# Patient Record
Sex: MALE | Race: AMERICAN INDIAN OR ALASKA NATIVE
[De-identification: names, ages, dates, MRNs, and addresses within clinical notes are randomized per-mention and may not be internally consistent; named-entity substitution may affect disease eponyms.]

---

## 2022-08-08 ENCOUNTER — HOSPITAL ENCOUNTER (EMERGENCY)
Dept: HOSPITAL 5 - ED | Age: 47
Discharge: HOME | End: 2022-08-08
Payer: COMMERCIAL

## 2022-08-08 VITALS — SYSTOLIC BLOOD PRESSURE: 179 MMHG | DIASTOLIC BLOOD PRESSURE: 96 MMHG

## 2022-08-08 DIAGNOSIS — K59.00: ICD-10-CM

## 2022-08-08 DIAGNOSIS — Z79.899: ICD-10-CM

## 2022-08-08 DIAGNOSIS — E11.9: ICD-10-CM

## 2022-08-08 DIAGNOSIS — A08.4: Primary | ICD-10-CM

## 2022-08-08 DIAGNOSIS — I10: ICD-10-CM

## 2022-08-08 DIAGNOSIS — F17.200: ICD-10-CM

## 2022-08-08 LAB
ALBUMIN SERPL-MCNC: 4.4 G/DL (ref 3.9–5)
ALT SERPL-CCNC: 12 UNITS/L (ref 7–56)
BASOPHILS # (AUTO): 0.1 K/MM3 (ref 0–0.1)
BASOPHILS NFR BLD AUTO: 1.8 % (ref 0–1.8)
BUN SERPL-MCNC: 48 MG/DL (ref 9–20)
BUN/CREAT SERPL: 4 %
CALCIUM SERPL-MCNC: 10 MG/DL (ref 8.4–10.2)
EOSINOPHIL # BLD AUTO: 0.6 K/MM3 (ref 0–0.4)
EOSINOPHIL NFR BLD AUTO: 9.2 % (ref 0–4.3)
HCT VFR BLD CALC: 40.7 % (ref 35.5–45.6)
HEMOLYSIS INDEX: 18
HGB BLD-MCNC: 13.1 GM/DL (ref 11.8–15.2)
LYMPHOCYTES # BLD AUTO: 0.8 K/MM3 (ref 1.2–5.4)
LYMPHOCYTES NFR BLD AUTO: 12.7 % (ref 13.4–35)
MCHC RBC AUTO-ENTMCNC: 32 % (ref 32–34)
MCV RBC AUTO: 80 FL (ref 84–94)
MONOCYTES # (AUTO): 0.9 K/MM3 (ref 0–0.8)
MONOCYTES % (AUTO): 15 % (ref 0–7.3)
PLATELET # BLD: 223 K/MM3 (ref 140–440)
RBC # BLD AUTO: 5.12 M/MM3 (ref 3.65–5.03)

## 2022-08-08 PROCEDURE — 83690 ASSAY OF LIPASE: CPT

## 2022-08-08 PROCEDURE — 80053 COMPREHEN METABOLIC PANEL: CPT

## 2022-08-08 PROCEDURE — 96375 TX/PRO/DX INJ NEW DRUG ADDON: CPT

## 2022-08-08 PROCEDURE — 74176 CT ABD & PELVIS W/O CONTRAST: CPT

## 2022-08-08 PROCEDURE — 96374 THER/PROPH/DIAG INJ IV PUSH: CPT

## 2022-08-08 PROCEDURE — 85025 COMPLETE CBC W/AUTO DIFF WBC: CPT

## 2022-08-08 PROCEDURE — 36415 COLL VENOUS BLD VENIPUNCTURE: CPT

## 2022-08-08 PROCEDURE — 99284 EMERGENCY DEPT VISIT MOD MDM: CPT

## 2022-08-08 NOTE — EMERGENCY DEPARTMENT REPORT
ED N/V/D HPI





- General


Chief complaint: Nausea/Vomiting/Diarrhea


Stated complaint: N/V


Source: EMS


Mode of arrival: Stretcher


Limitations: No Limitations





- History of Present Illness


Initial comments: 





Patient is a 46-year-old -American male with a history of hypertension a

nd ESRD on hemodialysis who presents to the ED with complaint of acute onset 

persistent diffuse abdominal pain with intractable nausea and vomiting and 

headache for the last 2 days.  Patient states that in the last 12 hours his 

symptoms are worsened such that he has had multiple vomiting episodes with 

worsening headache.  Patient denies dizziness, syncope, chest pain or shortness 

of breath, diarrhea, fever, chills, cough, sore throat, palpitations, change in 

vision, lightheadedness, hematemesis or hematochezia.


MD complaint: nausea, vomiting, abdominal pain (Diffuse)


-: days(s) (2)


Description of Vomiting: food contents, watery, bilious


Associated Abdominal Pain: Yes (Diffuse)


Location: diffuse


Radiation: none


Severity: severe


Pain Scale: 7


Quality: cramping, aching, sharp


Consistency: constant


Improves with: none


Worsens with: vomiting


Context: possible food poisoning


Associated Symptoms: denies other symptoms, headaches, loss of appetite, 

malaise, nausea/vomiting.  denies: myalgias, chest pain, cough, diaphoresis, 

fever/chills, rash, dysuria, shortness of breath, syncope, weakness





- Related Data


                                Home Medications











 Medication  Instructions  Recorded  Confirmed  Last Taken


 


Labetalol HCl [Labetalol 300mg TAB] 300 mg PO Q12H 22 

09:00








                                  Previous Rx's











 Medication  Instructions  Recorded  Last Taken  Type


 


NIFEdipine XL [Procardia Xl] 90 mg PO QDAY #60 tablet 22 09:00 Rx


 


Azithromycin [Zithromax TAB] 500 mg PO QDAY 3 Days #3 tablet 22 Unknown Rx


 


Doxazosin [Cardura] 4 mg PO BID 30 Days #30 tablet 22 Unknown Rx


 


ISOSORBIDE MONOnitrate [Imdur ER] 60 mg PO QDAY 30 Days #30 tablet 22 

Unknown Rx


 


NIFEdipine XL [Procardia Xl] 60 mg PO BID 30 Days #60 tablet 22 Unknown Rx


 


carvediloL [Coreg] 25 mg PO BID@0800,1700 30 Days #60 22 Unknown Rx





 tablet   


 


dexAMETHasone [Decadron] 6 mg PO Q24H 8 Days #8 tablet 22 Unknown Rx


 


hydrALAZINE [Apresoline TAB] 100 mg PO Q8HR 30 Days #90 tab 22 Unknown Rx


 


Dicyclomine [Bentyl] 20 mg PO Q6H PRN #30 tablet 22 Unknown Rx


 


Famotidine [Pepcid] 20 mg PO BID #60 tablet 22 Unknown Rx


 


Linaclotide [Linzess] 290 mcg PO DAILY #30 cap 22 Unknown Rx


 


Ondansetron [Zofran Odt] 4 mg PO Q8HR PRN #20 tab.rapdis 22 Unknown Rx











                                    Allergies











Allergy/AdvReac Type Severity Reaction Status Date / Time


 


No Known Allergies Allergy   Unverified 01/10/14 09:48














ED Review of Systems


ROS: 


Stated complaint: N/V


Other details as noted in HPI





Constitutional: denies: chills, fever


Eyes: denies: eye pain, eye discharge, vision change


ENT: denies: ear pain, throat pain


Respiratory: denies: cough, shortness of breath, wheezing


Cardiovascular: denies: chest pain, palpitations


Endocrine: no symptoms reported


Gastrointestinal: abdominal pain, nausea, vomiting.  denies: diarrhea


Genitourinary: denies: urgency, dysuria


Musculoskeletal: denies: back pain, joint swelling, arthralgia


Skin: denies: rash, lesions


Neurological: denies: headache, weakness, paresthesias


Psychiatric: denies: anxiety, depression


Hematological/Lymphatic: denies: easy bleeding, easy bruising





ED Past Medical Hx





- Past Medical History


Previous Medical History?: Yes


Hx Hypertension: Yes


Hx Congestive Heart Failure: No


Hx Diabetes: Yes


Hx Renal Disease: Yes


Hx Asthma: No


Hx COPD: No


Additional medical history: Brugada Syndrome, ventral abdominal hernia





- Surgical History


Past Surgical History?: Yes


Additional Surgical History: left AV Graft





- Social History


Smoking Status: Current Every Day Smoker


Substance Use Type: None





- Medications


Home Medications: 


                                Home Medications











 Medication  Instructions  Recorded  Confirmed  Last Taken  Type


 


NIFEdipine XL [Procardia Xl] 90 mg PO QDAY #60 tablet 22

09:00 Rx


 


Labetalol HCl [Labetalol 300mg TAB] 300 mg PO Q12H 22 

09:00 History


 


Azithromycin [Zithromax TAB] 500 mg PO QDAY 3 Days #3 tablet 22  Unknown 

Rx


 


Doxazosin [Cardura] 4 mg PO BID 30 Days #30 tablet 22  Unknown Rx


 


ISOSORBIDE MONOnitrate [Imdur ER] 60 mg PO QDAY 30 Days #30 tablet 22  

Unknown Rx


 


NIFEdipine XL [Procardia Xl] 60 mg PO BID 30 Days #60 tablet 22  Unknown 

Rx


 


carvediloL [Coreg] 25 mg PO BID@0800,1700 30 Days #60 22  Unknown Rx





 tablet    


 


dexAMETHasone [Decadron] 6 mg PO Q24H 8 Days #8 tablet 22  Unknown Rx


 


hydrALAZINE [Apresoline TAB] 100 mg PO Q8HR 30 Days #90 tab 22  Unknown Rx


 


Dicyclomine [Bentyl] 20 mg PO Q6H PRN #30 tablet 22  Unknown Rx


 


Famotidine [Pepcid] 20 mg PO BID #60 tablet 22  Unknown Rx


 


Linaclotide [Linzess] 290 mcg PO DAILY #30 cap 22  Unknown Rx


 


Ondansetron [Zofran Odt] 4 mg PO Q8HR PRN #20 tab.rapdis 22  Unknown Rx














ED Physical Exam





- General


Limitations: No Limitations


General appearance: alert, in no apparent distress





- Head


Head exam: Present: atraumatic, normocephalic, normal inspection





- Eye


Eye exam: Present: normal appearance, PERRL, EOMI





- ENT


ENT exam: Present: normal exam, normal orophraynx, mucous membranes moist, TM's 

normal bilaterally, normal external ear exam





- Neck


Neck exam: Present: normal inspection, full ROM.  Absent: tenderness





- Respiratory


Respiratory exam: Present: normal lung sounds bilaterally.  Absent: respiratory 

distress, wheezes, rales, rhonchi, stridor, chest wall tenderness, accessory 

muscle use, decreased breath sounds, prolonged expiratory





- Cardiovascular


Cardiovascular Exam: Present: regular rate, normal rhythm, normal heart sounds. 

Absent: systolic murmur, diastolic murmur, rubs, gallop





- GI/Abdominal


GI/Abdominal exam: Present: soft, tenderness (diffuse), normal bowel sounds.  

Absent: guarding, hyperactive bowel sounds, hypoactive bowel sounds, o

rganomegaly, mass





- Extremities Exam


Extremities exam: Present: normal inspection, full ROM, normal capillary refill.

 Absent: tenderness





- Back Exam


Back exam: Present: normal inspection, full ROM.  Absent: tenderness, CVA 

tenderness (R), CVA tenderness (L), muscle spasm, paraspinal tenderness, 

vertebral tenderness





- Neurological Exam


Neurological exam: Present: alert, oriented X3, CN II-XII intact, normal gait, 

reflexes normal





- Psychiatric


Psychiatric exam: Present: normal affect, normal mood





- Skin


Skin exam: Present: warm, dry, intact, normal color.  Absent: rash





ED Course





                                   Vital Signs











  22





  01:18


 


Temperature 98.2 F


 


Pulse Rate 90


 


Respiratory 18





Rate 


 


Blood Pressure 168/88


 


O2 Sat by Pulse 99





Oximetry 














ED Medical Decision Making





- Lab Data


Result diagrams: 


                                 22 02:35





                                 22 02:35





- Radiology Data


Radiology results: report reviewed, image reviewed





[place] [address]  


 


                                          Cat Scan Report   


                                               Signed  


 


Patient: KIRBY GAUTHIER                                                   

            MR#: [id number]  


[id number]          


: 1975                                                                

Acct:[id number]      


 


Age/Sex: 46 / M                                                                

ADM Date: 22     


 


Loc: ED       


Attending Dr:   


 


 


Ordering Physician: IVET SALEH  


Date of Service: 22  


Procedure(s): CT abdomen pelvis wo con  


Accession Number(s): [id number]  


 


cc: IVET SALEH   


 


 


CT ABDOMEN AND PELVIS WITHOUT CONTRAST  


 


 INDICATION / CLINICAL INFORMATION: ABDOMINAL PAIN, N/V, DISTENSION R/O SBO.  


 


 TECHNIQUE: Axial CT images were obtained through the abdomen and pelvis without

IV contrast.  All 


CT scans at this location are performed using CT dose reduction for ALARA by 

means of automated 


exposure control.   


 


 COMPARISON: Chest radiograph from 2022  


 


 FINDINGS:  


 


 LOWER CHEST: The heart is enlarged with mild bibasilar interstitial edema.  


 LIVER: No significant abnormality  


 GALLBLADDER/BILIARY TREE: No significant abnormality    


 PANCREAS: No significant abnormality  


 SPLEEN: No significant abnormality  


 ADRENALS: No significant abnormality  


 KIDNEYS/URETERS: Bilateral renal atrophy. No acute findings. No hydronephrosis.

 


 URINARY BLADDER: Bladder is partially decompressed, though grossly 

unremarkable.  


 REPRODUCTIVE ORGANS: No significant abnormality  


 STOMACH / BOWEL: Large colonic stool burden with small bowel feces sign. No 

evidence of localized 


bowel inflammation or obstruction. Normal appendix.  


 


 LYMPH NODES: No significant adenopathy.  


 VASCULATURE: Moderate atherosclerotic calcification without acute abnormality. 

 


 OTHER: Mild mesenteric edema. No free air, free fluid, or focal fluid 

collection is identified.  


 


 SKELETAL SYSTEM: No acute osseous findings.  


 


 IMPRESSION:  


 


 1. No acute abnormality of the abdomen or pelvis.  


 2. Large colonic stool burden, consistent with constipation. No evidence of 

localized bowel 


inflammation or obstruction.  


 3. Findings of volume overload/CHF with cardiac enlargement and mild bibasilar 

interstitial edema.  


 4. Other incidental findings as above.  


 


 


 Signer Name: Samantha Yeh MD   


 Signed: 2022 3:09 AM  


 Workstation Name: [id number]   


 


 


Transcribed By: MARIXA  


Dictated By: SAMANTHA YEH MD  


Electronically Authenticated By: SAMANTHA YEH MD    


Signed Date/Time: 22                                


 


 


 


DD/DT: 22                                                            

 


TD/TT:


Print





- Medical Decision Making





This is a 46-year-old -American male with a history of hypertension and 

ESRD on hemodialysis who presents to the ED with complaint of acute onset 

persistent diffuse abdominal pain with intractable nausea and vomiting and 

headache for the last 2 days.  Patient states that in the last 12 hours his 

symptoms are worsened such that he has had multiple vomiting episodes with 

worsening headache.  In the ED, patient is alert and oriented x3 and is not in 

any distress.  Patient was treated in the ED for nausea and vomiting, also given

pain medications.  Lab test results were reviewed and are all nonactionable.  

Abdomen pelvis CT scan without contrast showed no acute abnormalities but inc

idental finding of large colonic stool burden, consistent with constipation. No 

evidence of localized bowel inflammation or obstruction.  Also showed findings 

of volume overload/CHF with cardiac enlargement and mild bibasilar interstitial 

edema which are consistent with the patient's ESRD condition.  On reevaluation, 

patient's pain is well controlled medication.  Patient nausea and vomiting also 

well controlled.  Patient passed oral fluid challenge in the ED.  Patient was 

also advised to maintain a clear liquid diet for 12 to 24 hours while taking 

medications.  Patient was discharged home on medications and advised to follow-

up with his primary care physician in 5 to 7 days for reevaluation or return to 

the ED immediately if symptoms get worse.





- Differential Diagnosis


Gastroenteritis; GERD; SBO; constipation; appendicitis; colitis;


Critical care attestation.: 


If time is entered above; I have spent that time in minutes in the direct care 

of this critically ill patient, excluding procedure time.








ED Disposition


Clinical Impression: 


 Nausea and vomiting in adult patient, Abdominal pain, generalized, Viral 

gastroenteritis





Constipation


Qualifiers:


 Constipation type: other constipation type Qualified Code(s): K59.09 - Other 

constipation





Disposition: 01 HOME / SELF CARE / HOMELESS


Is pt being admited?: No


Does the pt Need Aspirin: No


Condition: Stable


Instructions:  Viral Gastroenteritis, Adult, Easy-to-Read, Nausea and Vomiting, 

Adult, Easy-to-Read, Abdominal Pain, Adult, Easy-to-Read, Constipation, Adult, 

Easy-to-Read


Additional Instructions: 


All lab test results were reviewed and are all nonactionable.  Abdomen pelvis CT

scan without contrast showed large colonic stool burden, consistent with 

constipation. No evidence of localized bowel inflammation or obstruction.  It 

also showed findings of volume overload/CHF with cardiac enlargement and mild 

bibasilar interstitial edema consistent with your chronic kidney disease.  

Therefore maintain a clear liquid diet for 12 to 24 hours, and plenty of fluids,

take medication as needed for pain and nausea and vomiting as well as for 

constipation.  Follow-up with your primary care physician in 7 to 10 days for 

reevaluation or return to the ED immediately if symptoms get worse.


Prescriptions: 


Dicyclomine [Bentyl] 20 mg PO Q6H PRN #30 tablet


 PRN Reason: abdominal pain


Linaclotide [Linzess] 290 mcg PO DAILY #30 cap


Famotidine [Pepcid] 20 mg PO BID #60 tablet


Ondansetron [Zofran Odt] 4 mg PO Q8HR PRN #20 tab.rapdis


 PRN Reason: Nausea


Referrals: 


LIBBY BREWSTER MD [Primary Care Provider] - 3-5 Days


Time of Disposition: 04:48


Print Language: ENGLISH

## 2022-08-08 NOTE — CAT SCAN REPORT
CT ABDOMEN AND PELVIS WITHOUT CONTRAST



INDICATION / CLINICAL INFORMATION: ABDOMINAL PAIN, N/V, DISTENSION R/O SBO.



TECHNIQUE: Axial CT images were obtained through the abdomen and pelvis without IV contrast.  All CT 
scans at this location are performed using CT dose reduction for ALARA by means of automated exposure
 control. 



COMPARISON: Chest radiograph from 5/31/2022



FINDINGS:



LOWER CHEST: The heart is enlarged with mild bibasilar interstitial edema.

LIVER: No significant abnormality

GALLBLADDER/BILIARY TREE: No significant abnormality  

PANCREAS: No significant abnormality

SPLEEN: No significant abnormality

ADRENALS: No significant abnormality

KIDNEYS/URETERS: Bilateral renal atrophy. No acute findings. No hydronephrosis.

URINARY BLADDER: Bladder is partially decompressed, though grossly unremarkable.

REPRODUCTIVE ORGANS: No significant abnormality

STOMACH / BOWEL: Large colonic stool burden with small bowel feces sign. No evidence of localized bow
el inflammation or obstruction. Normal appendix.



LYMPH NODES: No significant adenopathy.

VASCULATURE: Moderate atherosclerotic calcification without acute abnormality. 

OTHER: Mild mesenteric edema. No free air, free fluid, or focal fluid collection is identified.



SKELETAL SYSTEM: No acute osseous findings.



IMPRESSION:



1. No acute abnormality of the abdomen or pelvis.

2. Large colonic stool burden, consistent with constipation. No evidence of localized bowel inflammat
ion or obstruction.

3. Findings of volume overload/CHF with cardiac enlargement and mild bibasilar interstitial edema.

4. Other incidental findings as above.





Signer Name: Keyshawn Yeh MD 

Signed: 8/8/2022 3:09 AM

Workstation Name: [email or web address]

## 2022-08-19 ENCOUNTER — HOSPITAL ENCOUNTER (INPATIENT)
Dept: HOSPITAL 5 - ED | Age: 47
LOS: 1 days | Discharge: HOME | DRG: 189 | End: 2022-08-20
Attending: INTERNAL MEDICINE | Admitting: INTERNAL MEDICINE
Payer: SELF-PAY

## 2022-08-19 DIAGNOSIS — Z87.891: ICD-10-CM

## 2022-08-19 DIAGNOSIS — I49.8: ICD-10-CM

## 2022-08-19 DIAGNOSIS — E78.5: ICD-10-CM

## 2022-08-19 DIAGNOSIS — J96.01: Primary | ICD-10-CM

## 2022-08-19 DIAGNOSIS — J81.0: ICD-10-CM

## 2022-08-19 DIAGNOSIS — N18.6: ICD-10-CM

## 2022-08-19 DIAGNOSIS — E11.22: ICD-10-CM

## 2022-08-19 DIAGNOSIS — I12.0: ICD-10-CM

## 2022-08-19 DIAGNOSIS — D63.1: ICD-10-CM

## 2022-08-19 DIAGNOSIS — J44.9: ICD-10-CM

## 2022-08-19 DIAGNOSIS — Z99.2: ICD-10-CM

## 2022-08-19 DIAGNOSIS — I16.1: ICD-10-CM

## 2022-08-19 LAB
BASOPHILS # (AUTO): 0.1 K/MM3 (ref 0–0.1)
BASOPHILS NFR BLD AUTO: 1.6 % (ref 0–1.8)
BUN SERPL-MCNC: 73 MG/DL (ref 9–20)
BUN/CREAT SERPL: 7 %
CALCIUM SERPL-MCNC: 9.8 MG/DL (ref 8.4–10.2)
EOSINOPHIL # BLD AUTO: 0.3 K/MM3 (ref 0–0.4)
EOSINOPHIL NFR BLD AUTO: 6.7 % (ref 0–4.3)
HCT VFR BLD CALC: 37.8 % (ref 35.5–45.6)
HEMOLYSIS INDEX: 2
HGB BLD-MCNC: 12.1 GM/DL (ref 11.8–15.2)
INR PPP: 0.94 (ref 0.87–1.13)
LYMPHOCYTES # BLD AUTO: 0.6 K/MM3 (ref 1.2–5.4)
LYMPHOCYTES NFR BLD AUTO: 12.2 % (ref 13.4–35)
MCHC RBC AUTO-ENTMCNC: 32 % (ref 32–34)
MCV RBC AUTO: 80 FL (ref 84–94)
MONOCYTES # (AUTO): 0.8 K/MM3 (ref 0–0.8)
MONOCYTES % (AUTO): 15.6 % (ref 0–7.3)
PLATELET # BLD: 203 K/MM3 (ref 140–440)
RBC # BLD AUTO: 4.71 M/MM3 (ref 3.65–5.03)

## 2022-08-19 PROCEDURE — 5A1D70Z PERFORMANCE OF URINARY FILTRATION, INTERMITTENT, LESS THAN 6 HOURS PER DAY: ICD-10-PCS | Performed by: INTERNAL MEDICINE

## 2022-08-19 PROCEDURE — 85025 COMPLETE CBC W/AUTO DIFF WBC: CPT

## 2022-08-19 PROCEDURE — 84484 ASSAY OF TROPONIN QUANT: CPT

## 2022-08-19 PROCEDURE — 94760 N-INVAS EAR/PLS OXIMETRY 1: CPT

## 2022-08-19 PROCEDURE — 94644 CONT INHLJ TX 1ST HOUR: CPT

## 2022-08-19 PROCEDURE — 71045 X-RAY EXAM CHEST 1 VIEW: CPT

## 2022-08-19 PROCEDURE — 94640 AIRWAY INHALATION TREATMENT: CPT

## 2022-08-19 PROCEDURE — 99285 EMERGENCY DEPT VISIT HI MDM: CPT

## 2022-08-19 PROCEDURE — 80053 COMPREHEN METABOLIC PANEL: CPT

## 2022-08-19 PROCEDURE — 36415 COLL VENOUS BLD VENIPUNCTURE: CPT

## 2022-08-19 PROCEDURE — 80048 BASIC METABOLIC PNL TOTAL CA: CPT

## 2022-08-19 PROCEDURE — 82550 ASSAY OF CK (CPK): CPT

## 2022-08-19 PROCEDURE — 93005 ELECTROCARDIOGRAM TRACING: CPT

## 2022-08-19 PROCEDURE — 82553 CREATINE MB FRACTION: CPT

## 2022-08-19 PROCEDURE — 80074 ACUTE HEPATITIS PANEL: CPT

## 2022-08-19 PROCEDURE — 85610 PROTHROMBIN TIME: CPT

## 2022-08-19 RX ADMIN — SENNOSIDES SCH MG: 8.6 TABLET, FILM COATED ORAL at 10:52

## 2022-08-19 RX ADMIN — IPRATROPIUM BROMIDE AND ALBUTEROL SULFATE SCH: .5; 3 SOLUTION RESPIRATORY (INHALATION) at 20:50

## 2022-08-19 RX ADMIN — Medication SCH ML: at 10:52

## 2022-08-19 RX ADMIN — HYDRALAZINE HYDROCHLORIDE SCH MG: 100 TABLET, FILM COATED ORAL at 19:58

## 2022-08-19 RX ADMIN — Medication SCH ML: at 22:00

## 2022-08-19 RX ADMIN — FAMOTIDINE SCH MG: 10 INJECTION, SOLUTION INTRAVENOUS at 22:00

## 2022-08-19 RX ADMIN — NIFEDIPINE SCH MG: 60 TABLET, EXTENDED RELEASE ORAL at 22:00

## 2022-08-19 RX ADMIN — HYDRALAZINE HYDROCHLORIDE PRN MG: 20 INJECTION INTRAMUSCULAR; INTRAVENOUS at 09:29

## 2022-08-19 RX ADMIN — HYDRALAZINE HYDROCHLORIDE SCH MG: 100 TABLET, FILM COATED ORAL at 22:00

## 2022-08-19 RX ADMIN — ARFORMOTEROL TARTRATE SCH MCG: 15 SOLUTION RESPIRATORY (INHALATION) at 20:50

## 2022-08-19 RX ADMIN — BUDESONIDE SCH MG: 0.5 INHALANT RESPIRATORY (INHALATION) at 20:50

## 2022-08-19 RX ADMIN — FAMOTIDINE SCH MG: 10 INJECTION, SOLUTION INTRAVENOUS at 09:29

## 2022-08-19 RX ADMIN — SENNOSIDES SCH MG: 8.6 TABLET, FILM COATED ORAL at 22:00

## 2022-08-19 NOTE — ELECTROCARDIOGRAPH REPORT
[facility]

                                       

Test Date:    2022               Test Time:    06:12:29

Pat Name:     KIRBY GAUTHIER            Department:   

Patient ID:   [id number]          Room:         A365

Gender:       M                        Technician:   MARIXA

:          1975               Requested By: PREETI GAMEZ

Order Number: [id number]             Reading MD:   Sarahi Kemp

                                 Measurements

Intervals                              Axis          

Rate:         84                       P:            62

WI:           196                      QRS:          52

QRSD:         83                       T:            35

QT:           365                                    

QTc:          431                                    

                           Interpretive Statements

Sinus rhythm

Biatrial enlargement

Probable left ventricular hypertrophy

Compared to ECG 2022 20:30:03

non st & T no longer present

Electronically Signed On 2022 22:46:30 EDT by Sarahi Kemp

## 2022-08-19 NOTE — CONSULTATION
History of Present Illness





- Reason for Consult


Consult date: 08/19/22


end stage renal disease





- History of Present Illness


The patient is a 45 YO male with history significant for Hypertension, DVT, 

Anemia, history of Brugada syndrome, ESRD on hemodialysis(MWF) and Medical non-

compliance who presented to [facility] ED 8/81922 with c/o sob for 2 days.  Associated

symptoms include cough, orthopnea and wheezing.  Admits to drinking excessive 

fluids.  Patient denies any N, V, abd pain, fever, chills, rash, leg swelling, 

cp, dizziness, hemoptysis or weakness.  He was last dialyzed 2 days ago.  [facility] hospital admissions with similar presentation.  All lab and imaging studies

reviewed.  Patient is currently on NC O2.  Nephrology consulted for treatment of

ESRD and volume overload.








Past History


Past Medical History: other (See HPI.)





Medications and Allergies


                                    Allergies











Allergy/AdvReac Type Severity Reaction Status Date / Time


 


No Known Allergies Allergy   Unverified 01/10/14 09:48











                                Home Medications











 Medication  Instructions  Recorded  Confirmed  Last Taken  Type


 


NIFEdipine XL [Procardia Xl] 90 mg PO QDAY #60 tablet 05/12/22 06/01/22 05/31/22

09:00 Rx


 


Labetalol HCl [Labetalol 300mg TAB] 300 mg PO Q12H 06/01/22 06/01/22 05/31/22 

09:00 History


 


Azithromycin [Zithromax TAB] 500 mg PO QDAY 3 Days #3 tablet 06/02/22  Unknown 

Rx


 


Doxazosin [Cardura] 4 mg PO BID 30 Days #30 tablet 06/02/22  Unknown Rx


 


ISOSORBIDE MONOnitrate [Imdur ER] 60 mg PO QDAY 30 Days #30 tablet 06/02/22  

Unknown Rx


 


NIFEdipine XL [Procardia Xl] 60 mg PO BID 30 Days #60 tablet 06/02/22  Unknown 

Rx


 


carvediloL [Coreg] 25 mg PO BID@0800,1700 30 Days #60 06/02/22  Unknown Rx





 tablet    


 


dexAMETHasone [Decadron] 6 mg PO Q24H 8 Days #8 tablet 06/02/22  Unknown Rx


 


hydrALAZINE [Apresoline TAB] 100 mg PO Q8HR 30 Days #90 tab 06/02/22  Unknown Rx


 


Dicyclomine [Bentyl] 20 mg PO Q6H PRN #30 tablet 08/08/22  Unknown Rx


 


Famotidine [Pepcid] 20 mg PO BID #60 tablet 08/08/22  Unknown Rx


 


Linaclotide [Linzess] 290 mcg PO DAILY #30 cap 08/08/22  Unknown Rx


 


Ondansetron [Zofran Odt] 4 mg PO Q8HR PRN #20 tab.rapdis 08/08/22  Unknown Rx











Active Meds: 


Active Medications





Acetaminophen (Acetaminophen 325 Mg Tab)  650 mg PO Q4H PRN


   PRN Reason: Pain MILD(1-3)/Fever >100.5/HA


Albuterol (Albuterol 2.5 Mg/3 Ml Nebu)  2.5 mg IH Q3HRT PRN


   PRN Reason: Shortness Of Breath


Albuterol/Ipratropium (Ipratropium/Albuterol Sulfate 3 Ml Ampul.Neb)  1 ampul IH

Q6HRT NANCY


Arformoterol Tartrate (Arformoterol 15 Mcg/2 Ml Nebu)  15 mcg IH Q12HRT NANCY


Budesonide (Budesonide 0.5 Mg/2 Ml Nebu)  0.5 mg IH Q12HRT NANCY


Dicyclomine HCl (Dicyclomine 20 Mg Tab)  20 mg PO Q6H PRN


   PRN Reason: abdominal pain


Famotidine (Famotidine 20 Mg/2 Ml Inj)  20 mg IV BID NANCY


Hydralazine HCl (Hydralazine 20 Mg/1 Ml Inj)  10 mg IV Q4HR PRN


   PRN Reason: Hypertension


Hydralazine HCl (Hydralazine 100 Mg Tab)  100 mg PO Q8HR [facility]


Isosorbide Mononitrate (Isosorbide Mononitrate Er 60 Mg Tab)  60 mg PO QDAY NANCY


Miscellaneous Medication (Labetalol Hcl [Labetalol 300mg Tab])  300 mg PO Q12H 

NANCY


Naloxone HCl (Naloxone 0.4 Mg/1 Ml Inj)  0.1 mg IV Q2MIN PRN


   PRN Reason: Res Rate </= 8 or 02 SAT < 92%


Ondansetron HCl (Ondansetron 4 Mg/2 Ml Inj)  4 mg IV Q8H PRN


   PRN Reason: Nausea And Vomiting


Ondansetron HCl (Ondansetron 4 Mg Odt Tab)  4 mg PO Q8HR PRN


   PRN Reason: Nausea


Oxycodone/Acetaminophen (Oxycodone /Acetaminophen 5-325mg Tab)  1 tab PO Q6H PRN


   PRN Reason: Pain, Moderate (4-6)


Senna (Sennosides 8.6 Mg Tab)  8.6 mg PO Q12HR NANCY


Sodium Chloride (Sodium Chloride 0.9% 10 Ml Flush Syringe)  10 ml IV BID NANCY


Sodium Chloride (Sodium Chloride 0.9% 10 Ml Flush Syringe)  10 ml IV PRN PRN


   PRN Reason: LINE FLUSH











Exam





- Vital Signs


Vital signs: 


                                   Vital Signs











Temp Pulse Resp BP Pulse Ox


 


 98.6 F   90   18   202/112   97 


 


 08/19/22 05:54  08/19/22 05:54  08/19/22 05:54  08/19/22 05:54  08/19/22 05:54














Results





- Lab Results





                                 08/19/22 06:54





                                 08/19/22 06:54


                             Most recent lab results











Calcium  9.8 mg/dL (8.4-10.2)   08/19/22  06:54    














Assessment and Plan


1. ESRD:


Patient is on maintenance hemodialysis, MWF schedule.


Last outpatient HD 8/17.


Hemodialysis: today.


 


2. FEN:


Volume overload, UF with HD, monitor.


Counseled to limit fluid intake.


Monitor lytes and volume status.





3. Acute respiratory distress, POA:


2/2 Acute pulmonary edema in the setting of volume overload.


Volume control thru HD.


Supplemental O2, wean as tolerated.





4. Hx of DVT.





5. Hypertension:


Continue home meds.


Volume control thru HD.


Follow BP.





6. Anemia, POA:


Chronic.


Epogen with HD as needed.


Monitor.





7. Brugada syndrome.











Subjective:


Patient was seen and examined at the bedside.











Examination:


General appearance: well-developed, appears stated age, thin built, no distress 

noted, NC O2


HEENT: ADDY


Neck: trachea midline


Respiratory: bibasal rales heard


Heart: S1S2, no murmur


Abdomen: soft, bowel sounds heard, NT, no palpable mass


Integumentary: no obvious rash


Neurologic: AO, non-focal


Ext: no edema


Hemodialysis access: L arm AVF

## 2022-08-19 NOTE — XRAY REPORT
CHEST 1 VIEW 



INDICATION: 

Shortness of breath.



COMPARISON: 

5/31/2022



FINDINGS:



SUPPORT DEVICES: None.



HEART: Within normal limits. 



LUNGS/PLEURA: Mild residual interstitial edema, previously moderate. No significant effusion. 



ADDITIONAL FINDINGS: None.







IMPRESSION:

1. Lung findings as above.



Signer Name: Dannie Spence MD 

Signed: 8/19/2022 7:51 AM

Workstation Name: [id number]

## 2022-08-19 NOTE — HISTORY AND PHYSICAL REPORT
History of Present Illness


Date of examination: 08/19/22


Date of admission: 


08/19/22


Chief complaint: 





Shortness of breath


History of present illness: 


Patient is a 47-year-old male with history of end-stage renal disease on 

hemodialysis and COPD was brought to the emergency room because of shortness of 

breath and respiratory distress.  Patient has been in the hospital multiple 

times and apparently has also been seen at a local hospital nearby.  

Unfortunately he follows up with multiple primary care doctors does not have 

adequate insurance coverage to have a consistent physician.  He presented to the

ED with EMS stated that he was awakened at 3 AM with shortness of breath.  He 

states he uses oxygen at 2 L at home but reports that he did not feel he was 

working.  On further questioning he said he was the EMS that said he was not 

working.  He was placed on 4 L of oxygen and nephrology was consulted as he was 

supposed to have dialysis today.  His admitting diagnosis suspected of flash 

pulmonary edema with resultant acute hypoxic respiratory failure.


We will also noted to have significantly elevated blood pressure.  He tells me 

that he has been tried on multiple blood pressure medications but they have the 

opposite effect multiple times on him.  Systolic blood pressure was 190





Past History


Past Medical History: anemia, ESRD, hypertension, hyperlipidemia


Social history: single, full code.  denies: smoking, alcohol abuse, IV drug use


Family history: no significant family history





Medications and Allergies


                                    Allergies











Allergy/AdvReac Type Severity Reaction Status Date / Time


 


No Known Allergies Allergy   Unverified 01/10/14 09:48











                                Home Medications











 Medication  Instructions  Recorded  Confirmed  Last Taken  Type


 


NIFEdipine XL [Procardia Xl] 90 mg PO QDAY #60 tablet 05/12/22 06/01/22 05/31/22

09:00 Rx


 


Labetalol HCl [Labetalol 300mg TAB] 300 mg PO Q12H 06/01/22 06/01/22 05/31/22 0

9:00 History


 


Azithromycin [Zithromax TAB] 500 mg PO QDAY 3 Days #3 tablet 06/02/22  Unknown 

Rx


 


Doxazosin [Cardura] 4 mg PO BID 30 Days #30 tablet 06/02/22  Unknown Rx


 


ISOSORBIDE MONOnitrate [Imdur ER] 60 mg PO QDAY 30 Days #30 tablet 06/02/22  

Unknown Rx


 


NIFEdipine XL [Procardia Xl] 60 mg PO BID 30 Days #60 tablet 06/02/22  Unknown 

Rx


 


carvediloL [Coreg] 25 mg PO BID@0800,1700 30 Days #60 06/02/22  Unknown Rx





 tablet    


 


dexAMETHasone [Decadron] 6 mg PO Q24H 8 Days #8 tablet 06/02/22  Unknown Rx


 


hydrALAZINE [Apresoline TAB] 100 mg PO Q8HR 30 Days #90 tab 06/02/22  Unknown Rx


 


Dicyclomine [Bentyl] 20 mg PO Q6H PRN #30 tablet 08/08/22  Unknown Rx


 


Famotidine [Pepcid] 20 mg PO BID #60 tablet 08/08/22  Unknown Rx


 


Linaclotide [Linzess] 290 mcg PO DAILY #30 cap 08/08/22  Unknown Rx


 


Ondansetron [Zofran Odt] 4 mg PO Q8HR PRN #20 tab.rapdis 08/08/22  Unknown Rx











Active Meds: 


Active Medications





Acetaminophen (Acetaminophen 325 Mg Tab)  650 mg PO Q4H PRN


   PRN Reason: Pain MILD(1-3)/Fever >100.5/HA


Albuterol (Albuterol 2.5 Mg/3 Ml Nebu)  2.5 mg IH Q3HRT PRN


   PRN Reason: Shortness Of Breath


Albuterol/Ipratropium (Ipratropium/Albuterol Sulfate 3 Ml Ampul.Neb)  1 ampul IH

Q6HRT NANCY


Arformoterol Tartrate (Arformoterol 15 Mcg/2 Ml Nebu)  15 mcg IH Q12HRT NANCY


Budesonide (Budesonide 0.5 Mg/2 Ml Nebu)  0.5 mg IH Q12HRT NANCY


Dicyclomine HCl (Dicyclomine 20 Mg Tab)  20 mg PO Q6H PRN


   PRN Reason: abdominal pain


Famotidine (Famotidine 20 Mg/2 Ml Inj)  20 mg IV BID NANCY


Hydralazine HCl (Hydralazine 20 Mg/1 Ml Inj)  10 mg IV Q4HR PRN


   PRN Reason: Hypertension


Hydralazine HCl (Hydralazine 100 Mg Tab)  100 mg PO Q8HR NANCY


Isosorbide Mononitrate (Isosorbide Mononitrate Er 60 Mg Tab)  60 mg PO QDAY NANCY


Miscellaneous Medication (Labetalol Hcl [Labetalol 300mg Tab])  300 mg PO Q12H 

NANCY


Naloxone HCl (Naloxone 0.4 Mg/1 Ml Inj)  0.1 mg IV Q2MIN PRN


   PRN Reason: Res Rate </= 8 or 02 SAT < 92%


Ondansetron HCl (Ondansetron 4 Mg/2 Ml Inj)  4 mg IV Q8H PRN


   PRN Reason: Nausea And Vomiting


Ondansetron HCl (Ondansetron 4 Mg Odt Tab)  4 mg PO Q8HR PRN


   PRN Reason: Nausea


Oxycodone/Acetaminophen (Oxycodone /Acetaminophen 5-325mg Tab)  1 tab PO Q6H PRN


   PRN Reason: Pain, Moderate (4-6)


Senna (Sennosides 8.6 Mg Tab)  8.6 mg PO Q12HR NANCY


Sodium Chloride (Sodium Chloride 0.9% 10 Ml Flush Syringe)  10 ml IV BID NANCY


Sodium Chloride (Sodium Chloride 0.9% 10 Ml Flush Syringe)  10 ml IV PRN PRN


   PRN Reason: LINE FLUSH











Review of Systems


All systems: negative


Constitutional: fatigue, lethargy


Ears, nose, mouth and throat: no nose pain, no nasal congestion, no dysphagia, 

no swelling in mouth, no swelling in throat, no vertigo, no neck 

fullness/pressure


Cardiovascular: orthopnea, rapid/irregular heart beat, edema, shortness of 

breath


Respiratory: shortness of breath, dyspnea on exertion, congestion, home oxygen


Gastrointestinal: abdominal pain, nausea, no vomiting, no diarrhea, no 

constipation, no change in bowel habits, no hematemesis, no coffee ground emesis


Genitourinary Male: no dysuria, no hematuria, no flank pain, no discharge, no 

urinary frequency, no urinary hesitancy, no nocturia, no incontinence


Rectal: no pain, no incontinence, no bleeding


Musculoskeletal: no neck stiffness, no neck pain, no shooting arm pain, no arm 

numbness/tingling, no low back pain, no redness of joints, no hot joints, no 

muscle weakness, no muscle cramps, no myalgias, no atrophy, no fractures, no 

loss of height, no prior amputations


Integumentary: no rash, no redness, no sores, no bullae, no darkening of skin, 

no brittle nails, no foot/leg ulcers, no onychomycosis


Neurological: no transient paralysis, no weakness, no parathesias, no numbness, 

no headaches, no change in speech, no confusion, no changes in smell/taste, no 

motor disturbance, no double vision, no hearing difficulties


Psychiatric: no memory loss, no sleep disturbances, no hypersomnia, no 

hopelessness, no difficulties concentrating, no confusion


Endocrine: no heat intolerance, no polydipsia, no nocturia, no flushing, no 

proptosis, no thyroid mass, no palpatations


Hematologic/Lymphatic: no easy bruising, no easy bleeding


Allergic/Immunologic: no wheezing





Exam





- Physical Exam


Narrative exam: 


VITAL SIGNS:  Reviewed.    


GENERAL:  The patient appears normally developed, in acute respiratory distress 

vital signs as documented.


HEAD:  No signs of head trauma.


EYES:  Pupils are equal.  Extraocular motions intact.  


EARS:  Hearing grossly intact.


MOUTH:  Oropharynx is normal. 


NECK:  No adenopathy, no JVD.  


CHEST:  Chest with crackles  breath sounds bilaterally.  Increased work of 

breathing with mild expiratory wheeze


CARDIAC:  Regular rate and rhythm.  S1 and S2, without murmurs, gallops, or 

rubs.


VASCULAR: AV graft to the left upper extremity no Edema.  Peripheral pulses 

normal and equal in all extremities.


ABDOMEN:  Soft, non tender and non distended.  No   rebound or guarding, and no 

masses palpated.   Bowel Sounds normal.


MUSCULOSKELETAL:  Good range of motion of all major joints. Extremities without 

clubbing, cyanosis or edema.  


NEUROLOGIC EXAM:  Alert and oriented x 3   No focal sensory or strength 

deficits.   Speech normal.  Follows commands.


PSYCHIATRIC:  Mood anxious.


SKIN:  detail exam as documented in skin assessment








- Constitutional


Vitals: 


                                        











Temp Pulse Resp BP Pulse Ox


 


 98.6 F   83   19   186/108   100 


 


 08/19/22 05:54  08/19/22 07:01  08/19/22 07:01  08/19/22 07:01  08/19/22 07:01














HEART Score





- HEART Score


Troponin: 


                                        











Troponin T  0.098 ng/mL (0.00-0.029)  H  08/19/22  06:54    














Results





- Labs


CBC & Chem 7: 


                                 08/19/22 06:54





                                 08/19/22 06:54


Labs: 


                             Laboratory Last Values











WBC  5.1 K/mm3 (4.5-11.0)   08/19/22  06:54    


 


RBC  4.71 M/mm3 (3.65-5.03)   08/19/22  06:54    


 


Hgb  12.1 gm/dl (11.8-15.2)   08/19/22  06:54    


 


Hct  37.8 % (35.5-45.6)   08/19/22  06:54    


 


MCV  80 fl (84-94)  L  08/19/22  06:54    


 


MCH  26 pg (28-32)  L  08/19/22  06:54    


 


MCHC  32 % (32-34)   08/19/22  06:54    


 


RDW  21.6 % (13.2-15.2)  H  08/19/22  06:54    


 


Plt Count  203 K/mm3 (140-440)   08/19/22  06:54    


 


Lymph % (Auto)  12.2 % (13.4-35.0)  L  08/19/22  06:54    


 


Mono % (Auto)  15.6 % (0.0-7.3)  H  08/19/22  06:54    


 


Eos % (Auto)  6.7 % (0.0-4.3)  H  08/19/22  06:54    


 


Baso % (Auto)  1.6 % (0.0-1.8)   08/19/22  06:54    


 


Lymph # (Auto)  0.6 K/mm3 (1.2-5.4)  L  08/19/22  06:54    


 


Mono # (Auto)  0.8 K/mm3 (0.0-0.8)   08/19/22  06:54    


 


Eos # (Auto)  0.3 K/mm3 (0.0-0.4)   08/19/22  06:54    


 


Baso # (Auto)  0.1 K/mm3 (0.0-0.1)   08/19/22  06:54    


 


Seg Neutrophils %  63.9 % (40.0-70.0)   08/19/22  06:54    


 


Seg Neutrophils #  3.3 K/mm3 (1.8-7.7)   08/19/22  06:54    


 


PT  13.6 Sec. (12.2-14.9)   08/19/22  06:54    


 


INR  0.94  (0.87-1.13)   08/19/22  06:54    


 


Sodium  139 mmol/L (137-145)   08/19/22  06:54    


 


Potassium  5.0 mmol/L (3.6-5.0)   08/19/22  06:54    


 


Chloride  99.6 mmol/L ()   08/19/22  06:54    


 


Carbon Dioxide  24 mmol/L (22-30)   08/19/22  06:54    


 


Anion Gap  20 mmol/L  08/19/22  06:54    


 


BUN  73 mg/dL (9-20)  H  08/19/22  06:54    


 


Creatinine  10.7 mg/dL (0.8-1.3)  H  08/19/22  06:54    


 


Estimated GFR  6 ml/min  08/19/22  06:54    


 


BUN/Creatinine Ratio  7 %  08/19/22  06:54    


 


Glucose  78 mg/dL ()   08/19/22  06:54    


 


Calcium  9.8 mg/dL (8.4-10.2)   08/19/22  06:54    


 


Total Creatine Kinase  150 units/L ()   08/19/22  06:54    


 


CK-MB (CK-2)  3.8 ng/mL (0.0-4.0)   08/19/22  06:54    


 


CK-MB (CK-2) Rel Index  2.5  (0-4)   08/19/22  06:54    


 


Troponin T  0.098 ng/mL (0.00-0.029)  H  08/19/22  06:54    














Assessment and Plan


Assessment and plan: 


 Patient is a 47-year-old male with history of end-stage renal disease on 

hemodialysis and COPD was brought to the emergency room because of shortness of 

breath and respiratory distress.  Patient has been in the hospital multiple 

times and apparently has also been seen at a local hospital nearby.  

Unfortunately he follows up with multiple primary care doctors does not have 

adequate insurance coverage to have a consistent physician.  He presented to the

ED with EMS stated that he was awakened at 3 AM with shortness of breath.  He 

states he uses oxygen at 2 L at home but reports that he did not feel he was 

working.  On further questioning he said he was the EMS that said he was not 

working.  He was placed on 4 L of oxygen and nephrology was consulted as he was 

supposed to have dialysis today.  His admitting diagnosis suspected of flash 

pulmonary edema with resultant acute hypoxic respiratory failure.


We will also noted to have significantly elevated blood pressure.  He tells me 

that he has been tried on multiple blood pressure medications but they have the 

opposite effect multiple times on him.  Systolic blood pressure was 190








Assessment and Plan:


#Hypertensive emergency


- restart medications:  Procardia, labetalol, hydralazine, isosorbide


- hydralazine IV prn, 





#End-stage renal disease needing dialysis


- Hemodialysis per nephrology, on MWF schedule, compliant


- patient OP nephro doc is Dr. Pan, consulted


- avoid nephrotoxic agents


- monitor I/O's


- renal adjust medications


- consult nephrology


- daily renal profile





# Flash Pulmonary edema


-Chest x-ray consistent with findings of pulmonary edema although improved comp

ared to yesterday


currently 98 % on 5 L nasal cannula


-Still with tripoding posturing.  If no improvement will obtain pulmonary 

consult


-Likely resulting from poorly controlled bp.


-Fluid restriction.  


- 





# Volume overload


 - Fluid restriction. 





#Anemia of CKD


-Hemoglobin 8.8


-Epogen as deemed appropriate by nephrology





# Hypertension


-As noted above





# DVT prophylaxis 


Heparin 5000 units subcu every 12 hours for DVT prophylaxis.  Pepcid 20 mg p.o. 

twice daily for GI prophylaxis.  Patient is a full code





Preventive care


-Had extensive discussion with the patient about preventive management 

considering underlining end-stage renal disease anemia and hypertension.  

Patient verbalized understanding.  Dietary restrictions discussed in detail.  

Also information on appropriate follow-up also done.  Financial planning team 

consulted to assist the patient with information about insurance available.  

Patient reports that he is vaccinated against coronavirus











The high probability of a clinically significant, sudden or life threatening 

deterioration of the [pulmonary, renal] system(s) required my full and direct 

attention, intervention and personal management. The aggregate critical care 

time was [90 minutes] minutes. This time is in addition to time spent performing

reported procedures but includes the following: 





[x] Data Review and interpretation 





[x Patient assessment and monitoring of vital signs 





[x] Documentation 





[x] Medication orders and management


Advance Directives: Yes (Full code.  35 minutes counseling)


Plan of care discussed with patient/family: Yes

## 2022-08-19 NOTE — EMERGENCY DEPARTMENT REPORT
HPI





- General


Chief Complaint: Dyspnea/Respdistress


Time Seen by Provider: 22 06:42





- HPI


HPI: 





Room 22








Patient is a 47-year-old male present with chief complaint of shortness of 

breath.  The patient states he went to sleep last night at 2200 in his normal 

state of health.  Patient states he was awakened at 03: 00 this morning with 

shortness of breath.  Patient denies chest pain or fever but admits to an 

occasional cough productive of clear sputum and sneezing this morning.  Patient 

is normally on 2 L oxygen nasal cannula as needed at home.  Here in the ED the 

patient states he does not feel short of breath while he is receiving 

supplemental O2





ED Past Medical Hx





- Past Medical History


Previous Medical History?: Yes


Hx Hypertension: Yes


Hx Diabetes: Yes


Hx Renal Disease: Yes


Additional medical history: Brugada Syndrome, ventral abdominal hernia





- Surgical History


Past Surgical History?: Yes


Additional Surgical History: left AV Graft





- Family History


Family history: no significant





- Social History


Smoking Status: Former Smoker (None x20 years)


Substance Use Type: None (Denies illicit drug use)





- Medications


Home Medications: 


                                Home Medications











 Medication  Instructions  Recorded  Confirmed  Last Taken  Type


 


NIFEdipine XL [Procardia Xl] 90 mg PO QDAY #60 tablet 22

09:00 Rx


 


Labetalol HCl [Labetalol 300mg TAB] 300 mg PO Q12H 22 

09:00 History


 


Azithromycin [Zithromax TAB] 500 mg PO QDAY 3 Days #3 tablet 22  Unknown 

Rx


 


Doxazosin [Cardura] 4 mg PO BID 30 Days #30 tablet 22  Unknown Rx


 


ISOSORBIDE MONOnitrate [Imdur ER] 60 mg PO QDAY 30 Days #30 tablet 22  

Unknown Rx


 


NIFEdipine XL [Procardia Xl] 60 mg PO BID 30 Days #60 tablet 22  Unknown 

Rx


 


carvediloL [Coreg] 25 mg PO BID@0800,1700 30 Days #60 22  Unknown Rx





 tablet    


 


dexAMETHasone [Decadron] 6 mg PO Q24H 8 Days #8 tablet 22  Unknown Rx


 


hydrALAZINE [Apresoline TAB] 100 mg PO Q8HR 30 Days #90 tab 22  Unknown Rx


 


Dicyclomine [Bentyl] 20 mg PO Q6H PRN #30 tablet 22  Unknown Rx


 


Famotidine [Pepcid] 20 mg PO BID #60 tablet 22  Unknown Rx


 


Linaclotide [Linzess] 290 mcg PO DAILY #30 cap 22  Unknown Rx


 


Ondansetron [Zofran Odt] 4 mg PO Q8HR PRN #20 tab.rapdis 22  Unknown Rx














ED Review of Systems


ROS: 


Stated complaint: SOB


Other details as noted in HPI





Constitutional: denies: fever


Eyes: denies: eye pain


ENT: denies: throat pain


Respiratory: cough, shortness of breath


Cardiovascular: denies: chest pain


Endocrine: no symptoms reported


Gastrointestinal: denies: abdominal pain


Genitourinary: denies: testicular pain


Musculoskeletal: denies: back pain


Neurological: headache





Physical Exam





- Physical Exam


Vital Signs: 


                                   Vital Signs











  22





  05:54 06:10 06:15


 


Temperature 98.6 F  


 


Pulse Rate 90 83 83


 


Respiratory 18 28 H 16





Rate   


 


Blood Pressure 202/112  185/105


 


O2 Sat by Pulse 97 100 100





Oximetry   














  22





  06:26 06:31 06:45


 


Temperature   


 


Pulse Rate  79 83


 


Respiratory 28 H 21 15





Rate   


 


Blood Pressure  191/145 186/108


 


O2 Sat by Pulse 100 100 100





Oximetry   











Physical Exam: 





GENERAL: The patient is well-developed well-nourished male lying on stretcher 

not appearing to be in acute distress. []


HEENT: Normocephalic.  Atraumatic.  Extraocular motions are intact.  Patient has

 moist mucous membranes.


NECK: Supple.  Trachea midline


CHEST/LUNGS: Clear to auscultation.  There is no respiratory distress noted.


HEART/CARDIOVASCULAR: Regular.  There is no tachycardia.  There is no gallop rub

 or murmur.


ABDOMEN: Abdomen is soft, nontender.  Patient has normal bowel sounds.  There is

 no abdominal distention.


SKIN: There is no rash.  There is no diaphoresis.


NEURO: The patient is awake, alert, and oriented.  The patient is cooperative.  

The patient has no focal neurologic deficits.  The patient has normal speech.  

GCS 15


MUSCULOSKELETAL: There is no evidence of acute injury.





ED Course


                                   Vital Signs











  22





  05:54 06:10 06:15


 


Temperature 98.6 F  


 


Pulse Rate 90 83 83


 


Respiratory 18 28 H 16





Rate   


 


Blood Pressure 202/112  185/105


 


O2 Sat by Pulse 97 100 100





Oximetry   














  22





  06:26 06:31 06:45


 


Temperature   


 


Pulse Rate  79 83


 


Respiratory 28 H 21 15





Rate   


 


Blood Pressure  191/145 186/108


 


O2 Sat by Pulse 100 100 100





Oximetry   














- Consultations


Consultation #1: 





22 08:01


Nephrology paged


22 08:32


Case discussed with Dr. Manuel VITALE Medical Decision Making





- Lab Data


Result diagrams: 


                                 22 06:54





                                 22 06:54





                                Laboratory Tests











  22





  06:54 06:54 06:54


 


WBC  5.1  


 


RBC  4.71  


 


Hgb  12.1  


 


Hct  37.8  


 


MCV  80 L  


 


MCH  26 L  


 


MCHC  32  


 


RDW  21.6 H  


 


Plt Count  203  


 


Lymph % (Auto)  12.2 L  


 


Mono % (Auto)  15.6 H  


 


Eos % (Auto)  6.7 H  


 


Baso % (Auto)  1.6  


 


Lymph # (Auto)  0.6 L  


 


Mono # (Auto)  0.8  


 


Eos # (Auto)  0.3  


 


Baso # (Auto)  0.1  


 


Seg Neutrophils %  63.9  


 


Seg Neutrophils #  3.3  


 


PT   13.6 


 


INR   0.94 


 


Sodium    139


 


Potassium    5.0


 


Chloride    99.6


 


Carbon Dioxide    24


 


Anion Gap    20


 


BUN    73 H


 


Creatinine    10.7 H


 


Estimated GFR    6


 


BUN/Creatinine Ratio    7


 


Glucose    78


 


Calcium    9.8


 


Total Creatine Kinase    150


 


CK-MB (CK-2)    3.8


 


CK-MB (CK-2) Rel Index    2.5


 


Troponin T    0.098 H














- EKG Data


-: EKG Interpreted by Me


EKG shows normal: sinus rhythm


Rate: normal





- EKG Data


When compared to previous EKG there are: no significant change


Interpretation: no acute changes





- Radiology Data


Radiology results: report reviewed (Chest x-ray), image reviewed (Chest x-ray)


interpreted by me: 





Chest x-ray-interstitial edema, no pneumothorax





[facility]  


                                     [address] [place] [address]  


 


                                            XRay Report   


                                               Signed  


 


Patient: KIRBY GAUTHIER                                                   

             MR#: [id number]  


[id number]          


: 1975                                                                

Acct:[id number]      


 


Age/Sex: 47 / M                                                                

ADM Date: 22     


 


Loc: ED       


Attending Dr:   


 


 


Ordering Physician: PREETI GAMEZ MD  


Date of Service: 22  


Procedure(s): XR chest 1V ap  


Accession Number(s): [id number]  


 


cc: PREETI GAMEZ MD   


 


Fluoro Time In Minutes:   


 


CHEST 1 VIEW   


 


 INDICATION:   


 Shortness of breath.  


 


 COMPARISON:   


 2022  


 


 FINDINGS:  


 


 SUPPORT DEVICES: None.  


 


 HEART: Within normal limits.   


 


 LUNGS/PLEURA: Mild residual interstitial edema, previously moderate. No 

significant effusion.   


 


 ADDITIONAL FINDINGS: None.  


 


 


 


 IMPRESSION:  


 1. Lung findings as above.  


 


 Signer Name: Dannie Spence MD   


 Signed: 2022 7:51 AM  


 Workstation Name: [id number]   


 


 


Transcribed By:   


Dictated By: Dannie Spence MD  


Electronically Authenticated By: Dannie Spence MD    


Signed Date/Time: 22                                


 


 


 


DD/DT: 22                                                            

  


TD/TT:





- Differential Diagnosis


Volume overload, pneumonia, bronchitis


Critical care attestation.: 


If time is entered above; I have spent that time in minutes in the direct care 

of this critically ill patient, excluding procedure time.








ED Disposition


Clinical Impression: 


 Volume overload, End-stage renal disease needing dialysis, Shortness of breath





Disposition:  ADMITTED AS INPATIENT


Is pt being admited?: Yes


Does the pt Need Aspirin: No


Condition: Fair


Time of Disposition: 08:32 (Care transferred to hospitalist Dr. Olson (discussed 

with Dr. Silveira))

## 2022-08-20 VITALS — DIASTOLIC BLOOD PRESSURE: 84 MMHG | SYSTOLIC BLOOD PRESSURE: 140 MMHG

## 2022-08-20 LAB
ALBUMIN SERPL-MCNC: 4 G/DL (ref 3.9–5)
ALT SERPL-CCNC: 14 UNITS/L (ref 7–56)
BASOPHILS # (AUTO): 0.1 K/MM3 (ref 0–0.1)
BASOPHILS NFR BLD AUTO: 2.9 % (ref 0–1.8)
BUN SERPL-MCNC: 38 MG/DL (ref 9–20)
BUN/CREAT SERPL: 5 %
CALCIUM SERPL-MCNC: 9.2 MG/DL (ref 8.4–10.2)
EOSINOPHIL # BLD AUTO: 0.4 K/MM3 (ref 0–0.4)
EOSINOPHIL NFR BLD AUTO: 8.8 % (ref 0–4.3)
HCT VFR BLD CALC: 37.8 % (ref 35.5–45.6)
HEMOLYSIS INDEX: 8
HGB BLD-MCNC: 12 GM/DL (ref 11.8–15.2)
LYMPHOCYTES # BLD AUTO: 0.8 K/MM3 (ref 1.2–5.4)
LYMPHOCYTES NFR BLD AUTO: 17.8 % (ref 13.4–35)
MCHC RBC AUTO-ENTMCNC: 32 % (ref 32–34)
MCV RBC AUTO: 80 FL (ref 84–94)
MONOCYTES # (AUTO): 0.7 K/MM3 (ref 0–0.8)
MONOCYTES % (AUTO): 15.3 % (ref 0–7.3)
PLATELET # BLD: 198 K/MM3 (ref 140–440)
RBC # BLD AUTO: 4.71 M/MM3 (ref 3.65–5.03)

## 2022-08-20 RX ADMIN — IPRATROPIUM BROMIDE AND ALBUTEROL SULFATE SCH AMPUL: .5; 3 SOLUTION RESPIRATORY (INHALATION) at 14:45

## 2022-08-20 RX ADMIN — ARFORMOTEROL TARTRATE SCH MCG: 15 SOLUTION RESPIRATORY (INHALATION) at 08:11

## 2022-08-20 RX ADMIN — HYDRALAZINE HYDROCHLORIDE SCH MG: 100 TABLET, FILM COATED ORAL at 13:31

## 2022-08-20 RX ADMIN — Medication SCH ML: at 09:50

## 2022-08-20 RX ADMIN — NIFEDIPINE SCH MG: 60 TABLET, EXTENDED RELEASE ORAL at 09:49

## 2022-08-20 RX ADMIN — BUDESONIDE SCH MG: 0.5 INHALANT RESPIRATORY (INHALATION) at 08:11

## 2022-08-20 RX ADMIN — ARFORMOTEROL TARTRATE SCH: 15 SOLUTION RESPIRATORY (INHALATION) at 00:13

## 2022-08-20 RX ADMIN — IPRATROPIUM BROMIDE AND ALBUTEROL SULFATE SCH AMPUL: .5; 3 SOLUTION RESPIRATORY (INHALATION) at 08:11

## 2022-08-20 RX ADMIN — HYDRALAZINE HYDROCHLORIDE PRN MG: 20 INJECTION INTRAMUSCULAR; INTRAVENOUS at 02:30

## 2022-08-20 RX ADMIN — HYDRALAZINE HYDROCHLORIDE SCH MG: 100 TABLET, FILM COATED ORAL at 05:10

## 2022-08-20 RX ADMIN — SENNOSIDES SCH MG: 8.6 TABLET, FILM COATED ORAL at 09:49

## 2022-08-20 NOTE — DISCHARGE SUMMARY
Providers





- Providers


Date of Admission: 


08/19/22 08:55





Date of discharge: 08/20/22


Attending physician: 


KELLEN SELF





                                        





08/19/22


Consult to Case Management [CONS] Routine 


   Services Needed at Discharge: 


   Notified:: no


   Additional Physician Instructions: malfunctioning Home O2 machine





08/19/22 08:30


Consult to Physician [CONS] Urgent 


   Comment: 


   Consulting Provider: JEFFREY MARTINEZ


   Physician Instructions: 


   Reason For Exam: ESRD needing dialysis











Primary care physician: 


PRIMARY CARE MD








Hospitalization


Condition: Fair


Hospital course: 





Patient is a 47-year-old male with history of end-stage renal disease on 

hemodialysis and COPD was brought to the emergency room because of shortness of 

breath and respiratory distress.  Patient has been in the hospital multiple 

times and apparently has also been seen at a local hospital nearby.  

Unfortunately he follows up with multiple primary care doctors does not have 

adequate insurance coverage to have a consistent physician.  He presented to the

 ED with EMS stated that he was awakened at 3 AM with shortness of breath.  He 

states he uses oxygen at 2 L at home but reports that he did not feel he was 

working.  On further questioning he said he was the EMS that said he was not 

working.  He was placed on 4 L of oxygen and nephrology was consulted as he was 

supposed to have dialysis today.  His admitting diagnosis suspected of flash 

pulmonary edema with resultant acute hypoxic respiratory failure.


We will also noted to have significantly elevated blood pressure.  He tells me 

that he has been tried on multiple blood pressure medications but they have the 

opposite effect multiple times on him.  Systolic blood pressure was 190





8/20/2022


Blood pressure controlled


Patient underwent hemodialysis last night


Patient symptomatically better











Assessment and Plan:


#Hypertensive emergency


-Medications adjusted and blood pressure controlled


#End-stage renal disease needing dialysis


- Hemodialysis per nephrology, on MWF schedule, compliant


- patient OP nephro doc is Dr. Pan, consulted


- avoid nephrotoxic agents


- monitor I/O's


- renal adjust medications


- consult nephrology


- daily renal profile





# Flash Pulmonary edema


-Chest x-ray consistent with findings of pulmonary edema although improved 

compared to yesterday


currently 98 % on 5 L nasal cannula


-Still with tripoding posturing.  If no improvement will obtain pulmonary 

consult


-Likely resulting from poorly controlled bp.


-Fluid restriction.  


- 





# Volume overload


 - Fluid restriction. 





#Anemia of CKD


-Hemoglobin 8.8


-Epogen as deemed appropriate by nephrology





# Hypertension


-As noted above





# DVT prophylaxis 


Heparin 5000 units subcu every 12 hours for DVT prophylaxis.  Pepcid 20 mg p.o. 

twice daily for GI prophylaxis.  Patient is a full code





Preventive care


-Had extensive discussion with the patient about preventive management 

considering underlining end-stage renal disease anemia and hypertension.  

Patient verbalized understanding.  Dietary restrictions discussed in detail.  

Also information on appropriate follow-up also done.  Financial planning team 

consulted to assist the patient with information about insurance available.  

Patient reports that he is vaccinated against coronavirus








Disposition: 01 HOME / SELF CARE / HOMELESS


Final Discharge Diagnosis (Prints w/discharge instructions): Hypertensive 

emergency.  Volume overload.  Flash pulmonary edema.  Anemia of CKD.  End-stage 

renal disease


Time spent for discharge: 36 minutes





- Discharge Diagnoses


(1) Acute hypoxemic respiratory failure


Status: Acute   





(2) End-stage renal disease needing dialysis


Status: Acute   





(3) Hypertensive emergency


Status: Acute   





(4) DVT prophylaxis


Status: Acute   





Core Measure Documentation





- Palliative Care


Palliative Care/ Comfort Measures: Not Applicable





- Core Measures


Any of the following diagnoses?: none





Exam





- Constitutional


Vitals: 


                                        











Temp Pulse Resp BP Pulse Ox


 


 98.3 F   76   18   165/96   98 


 


 08/20/22 11:17  08/20/22 14:45  08/20/22 14:45  08/20/22 11:17  08/20/22 11:56











General appearance: Present: no acute distress, well-nourished





- EENT


Eyes: Present: PERRL


ENT: hearing intact, clear oral mucosa





- Neck


Neck: Present: supple, normal ROM





- Respiratory


Respiratory effort: normal


Respiratory: bilateral: CTA





- Cardiovascular


Heart rate: 78


Rhythm: regular


Heart Sounds: Present: S1 & S2.  Absent: rub, click





- Extremities


Extremities: pulses symmetrical, No edema


Peripheral Pulses: within normal limits





- Abdominal


General gastrointestinal: Present: soft, non-tender, non-distended, normal bowel

 sounds


Male genitourinary: Present: normal





- Integumentary


Integumentary: Present: clear, warm, dry





- Musculoskeletal


Musculoskeletal: gait normal, strength equal bilaterally





- Psychiatric


Psychiatric: appropriate mood/affect, intact judgment & insight





- Neurologic


Neurologic: CNII-XII intact, moves all extremities





Plan


Activity: no restrictions


Diet: renal


Follow up with: 


PRIMARY CARE,MD [Primary Care Provider] - 7 Days


JEFFREY MARTINEZ MD [Staff Physician] - 7 Days

## 2022-08-20 NOTE — PROGRESS NOTE
Assessment and Plan


1. ESRD:


Patient is on maintenance hemodialysis, MWF schedule.


Last outpatient HD 8/17.


Hemodialysis: 8/19.


 


2. FEN:


Volume overload, UF with HD, monitor.


Counseled to limit fluid intake.


Monitor lytes and volume status.





3. Acute respiratory distress, POA:


2/2 Acute pulmonary edema in the setting of volume overload.


Volume control thru HD.


Supplemental O2, wean as tolerated.





4. Hx of DVT.





5. Hypertension:


Continue home meds.


Volume control thru HD.


Follow BP.





6. Anemia, POA:


Chronic.


Epogen with HD as needed.


Monitor.





7. Brugada syndrome.











Subjective:


Patient was seen and examined at the bedside.


Doing better.











Examination:


General appearance: well-developed, appears stated age, thin built, no distress 

noted, NC O2


HEENT: ADDY


Neck: trachea midline


Respiratory: ctab


Heart: S1S2, no murmur


Abdomen: soft, bowel sounds heard, NT, no palpable mass


Integumentary: no obvious rash


Neurologic: AO, non-focal


Ext: no edema


Hemodialysis access: L arm AVF








Subjective


Date of service: 08/20/22





Objective





- Vital Signs


Vital signs: 


                               Vital Signs - 12hr











  08/20/22 08/20/22 08/20/22





  02:05 02:21 02:30


 


Temperature 98 F  


 


Pulse Rate 78  75


 


Pulse Rate [   





Posterior   





Throughout]   


 


Respiratory 18 20 





Rate   


 


Respiratory   





Rate [Posterior   





Throughout]   


 


Blood Pressure   190/100


 


Blood Pressure 193/105  





[Left]   


 


O2 Sat by Pulse 98 98 





Oximetry   














  08/20/22 08/20/22 08/20/22





  04:23 08:11 08:17


 


Temperature   


 


Pulse Rate 74  


 


Pulse Rate [  76 





Posterior   





Throughout]   


 


Respiratory   





Rate   


 


Respiratory  18 





Rate [Posterior   





Throughout]   


 


Blood Pressure   


 


Blood Pressure 176/97  





[Left]   


 


O2 Sat by Pulse   100





Oximetry   














  08/20/22 08/20/22 08/20/22





  09:49 09:50 11:17


 


Temperature   98.3 F


 


Pulse Rate   


 


Pulse Rate [   





Posterior   





Throughout]   


 


Respiratory   18





Rate   


 


Respiratory   





Rate [Posterior   





Throughout]   


 


Blood Pressure 171/97 171/97 165/96


 


Blood Pressure   





[Left]   


 


O2 Sat by Pulse   





Oximetry   














  08/20/22





  11:56


 


Temperature 


 


Pulse Rate 


 


Pulse Rate [ 





Posterior 





Throughout] 


 


Respiratory 





Rate 


 


Respiratory 





Rate [Posterior 





Throughout] 


 


Blood Pressure 


 


Blood Pressure 





[Left] 


 


O2 Sat by Pulse 98





Oximetry 














- Lab





                                 08/20/22 09:40





                                 08/20/22 09:40


                             Most recent lab results











Calcium  9.2 mg/dL (8.4-10.2)   08/20/22  09:40    














Medications & Allergies





- Medications


Allergies/Adverse Reactions: 


                                    Allergies





No Known Allergies Allergy (Unverified 01/10/14 09:48)


   








Home Medications: 


                                Home Medications











 Medication  Instructions  Recorded  Confirmed  Last Taken  Type


 


NIFEdipine XL [Procardia Xl] 90 mg PO QDAY #60 tablet 05/12/22 06/01/22 05/31/22

09:00 Rx


 


Azithromycin [Zithromax TAB] 500 mg PO QDAY 3 Days #3 tablet 06/02/22  Unknown 

Rx


 


Doxazosin [Cardura] 4 mg PO BID 30 Days #30 tablet 06/02/22  Unknown Rx


 


NIFEdipine XL [Procardia Xl] 60 mg PO BID 30 Days #60 tablet 06/02/22  Unknown 

Rx


 


carvediloL [Coreg] 25 mg PO BID@0800,1700 30 Days #60 06/02/22  Unknown Rx





 tablet    


 


Famotidine [Pepcid] 20 mg PO BID #60 tablet 08/08/22  Unknown Rx


 


Linaclotide [Linzess] 290 mcg PO DAILY #30 cap 08/08/22  Unknown Rx


 


Ondansetron [Zofran Odt] 4 mg PO Q8HR PRN #20 tab.rapdis 08/08/22  Unknown Rx


 


Dicyclomine [Bentyl] 20 mg PO Q6H PRN  tablet 08/20/22  Unknown Rx


 


ISOSORBIDE MONOnitrate [Imdur ER] 60 mg PO QDAY  tablet 08/20/22  Unknown Rx


 


Labetalol HCl [Labetalol 300mg TAB] 300 mg PO Q8H 30 Days #90 08/20/22  Unknown 

Rx


 


hydrALAZINE [Apresoline TAB] 100 mg PO Q8HR #90 tab 08/20/22  Unknown Rx











Active Medications: 














Generic Name Dose Route Start Last Admin





  Trade Name Freq  PRN Reason Stop Dose Admin


 


Acetaminophen  650 mg  08/19/22 09:00 





  Acetaminophen 325 Mg Tab  PO  





  Q4H PRN  





  Pain MILD(1-3)/Fever >100.5/HA  


 


Albuterol  2.5 mg  08/19/22 09:00 





  Albuterol 2.5 Mg/3 Ml Nebu  IH  





  Q3HRT PRN  





  Shortness Of Breath  


 


Albuterol/Ipratropium  1 ampul  08/20/22 08:00  08/20/22 08:11





  Ipratropium/Albuterol Sulfate 3 Ml Ampul.Neb  IH   1 ampul





  TIDRT NANCY   Administration


 


Arformoterol Tartrate  15 mcg  08/19/22 09:00  08/20/22 08:11





  Arformoterol 15 Mcg/2 Ml Nebu  IH   15 mcg





  Q12HRT NANCY   Administration


 


Budesonide  0.5 mg  08/19/22 20:00  08/20/22 08:11





  Budesonide 0.5 Mg/2 Ml Nebu  IH   0.5 mg





  Q12HRT NANCY   Administration


 


Dicyclomine HCl  20 mg  08/19/22 10:00 





  Dicyclomine 20 Mg Tab  PO  





  Q6H PRN  





  abdominal pain  


 


Famotidine  10 mg  08/20/22 10:00  08/20/22 09:49





  Famotidine 20 Mg/2 Ml Inj  IV   10 mg





  BID NANCY   Administration


 


Heparin Sodium (Porcine)  3,000 unit  08/19/22 10:00 





  Heparin 10,000 Units/10 Ml Vial  IV  





  DEONDRE PRN  





  hemodialysis  


 


Hydralazine HCl  10 mg  08/19/22 10:00  08/20/22 02:30





  Hydralazine 20 Mg/1 Ml Inj  IV   10 mg





  Q4HR PRN   Administration





  Hypertension  


 


Hydralazine HCl  100 mg  08/19/22 14:00  08/20/22 05:10





  Hydralazine 100 Mg Tab  PO   100 mg





  Q8HR NANCY   Administration


 


Sodium Chloride  100 mls @ 999 mls/hr  08/19/22 10:00 





  Nacl 0.9%  IV  





  DEONDRE PRN  





  Hypotension  


 


Isosorbide Mononitrate  60 mg  08/19/22 10:00  08/20/22 09:49





  Isosorbide Mononitrate Er 60 Mg Tab  PO   60 mg





  QDAY NANCY   Administration


 


Labetalol HCl  100 mg  08/19/22 10:00  08/20/22 09:50





  Labetalol 100 Mg Tab  PO   100 mg





  BID NANCY   Administration


 


Labetalol HCl  200 mg  08/19/22 10:00  08/20/22 09:50





  Labetalol 200 Mg Tab  PO   200 mg





  BID NANCY   Administration


 


Naloxone HCl  0.1 mg  08/19/22 09:00 





  Naloxone 0.4 Mg/1 Ml Inj  IV  





  Q2MIN PRN  





  Res Rate </= 8 or 02 SAT < 92%  


 


Nifedipine  60 mg  08/19/22 22:00  08/20/22 09:49





  Nifedipine Xl 60 Mg Tab  PO   60 mg





  BID NANCY   Administration


 


Ondansetron HCl  4 mg  08/19/22 10:00 





  Ondansetron 4 Mg/2 Ml Inj  IV  





  Q8H PRN  





  Nausea And Vomiting  


 


Ondansetron HCl  4 mg  08/19/22 10:00 





  Ondansetron 4 Mg Odt Tab  PO  





  Q8HR PRN  





  Nausea  


 


Oxycodone/Acetaminophen  1 tab  08/19/22 10:00  08/19/22 23:43





  Oxycodone /Acetaminophen 5-325mg Tab  PO   1 tab





  Q6H PRN   Administration





  Pain, Moderate (4-6)  


 


Senna  8.6 mg  08/19/22 10:00  08/20/22 09:49





  Sennosides 8.6 Mg Tab  PO   8.6 mg





  Q12HR NANCY   Administration


 


Sodium Chloride  10 ml  08/19/22 10:00  08/20/22 09:50





  Sodium Chloride 0.9% 10 Ml Flush Syringe  IV   10 ml





  BID NANCY   Administration


 


Sodium Chloride  10 ml  08/19/22 10:00 





  Sodium Chloride 0.9% 10 Ml Flush Syringe  IV  





  PRN PRN  





  LINE FLUSH